# Patient Record
Sex: FEMALE | Race: WHITE | NOT HISPANIC OR LATINO
[De-identification: names, ages, dates, MRNs, and addresses within clinical notes are randomized per-mention and may not be internally consistent; named-entity substitution may affect disease eponyms.]

---

## 2017-10-14 ENCOUNTER — APPOINTMENT (OUTPATIENT)
Dept: INTERNAL MEDICINE | Facility: HOME HEALTH | Age: 82
End: 2017-10-14

## 2017-12-20 ENCOUNTER — APPOINTMENT (OUTPATIENT)
Dept: INTERNAL MEDICINE | Facility: HOME HEALTH | Age: 82
End: 2017-12-20
Payer: MEDICARE

## 2017-12-20 VITALS
OXYGEN SATURATION: 98 % | TEMPERATURE: 97.8 F | DIASTOLIC BLOOD PRESSURE: 70 MMHG | SYSTOLIC BLOOD PRESSURE: 106 MMHG | RESPIRATION RATE: 13 BRPM | HEART RATE: 77 BPM

## 2017-12-20 DIAGNOSIS — Z00.00 ENCOUNTER FOR GENERAL ADULT MEDICAL EXAMINATION W/OUT ABNORMAL FINDINGS: ICD-10-CM

## 2017-12-20 DIAGNOSIS — Z78.9 OTHER SPECIFIED HEALTH STATUS: ICD-10-CM

## 2017-12-20 PROCEDURE — 99345 HOME/RES VST NEW HIGH MDM 75: CPT

## 2017-12-20 RX ORDER — NYSTATIN AND TRIAMCINOLONE ACETONIDE 100000; 1 [USP'U]/G; MG/G
100000-0.1 OINTMENT TOPICAL
Qty: 60 | Refills: 0 | Status: DISCONTINUED | COMMUNITY
Start: 2017-05-03

## 2017-12-20 RX ORDER — BUPROPION HYDROCHLORIDE 150 MG/1
150 TABLET, EXTENDED RELEASE ORAL
Qty: 30 | Refills: 0 | Status: DISCONTINUED | COMMUNITY
Start: 2017-12-18 | End: 2017-12-20

## 2017-12-20 RX ORDER — LEVOMEFOLATE CALCIUM 15 MG
15 TABLET ORAL
Qty: 30 | Refills: 0 | Status: DISCONTINUED | COMMUNITY
Start: 2017-08-15

## 2017-12-20 RX ORDER — SIMVASTATIN 40 MG/1
40 TABLET, FILM COATED ORAL
Qty: 30 | Refills: 0 | Status: DISCONTINUED | COMMUNITY
Start: 2017-02-23

## 2017-12-29 LAB
25(OH)D3 SERPL-MCNC: 24.6 NG/ML
ALBUMIN SERPL ELPH-MCNC: 3.7 G/DL
ALP BLD-CCNC: 84 U/L
ALT SERPL-CCNC: 21 U/L
ANION GAP SERPL CALC-SCNC: 16 MMOL/L
AST SERPL-CCNC: 18 U/L
BASOPHILS # BLD AUTO: 0.05 K/UL
BASOPHILS NFR BLD AUTO: 0.5 %
BILIRUB SERPL-MCNC: <0.2 MG/DL
BUN SERPL-MCNC: 24 MG/DL
CALCIUM SERPL-MCNC: 9.8 MG/DL
CHLORIDE SERPL-SCNC: 102 MMOL/L
CHOLEST SERPL-MCNC: 135 MG/DL
CHOLEST/HDLC SERPL: 3.2 RATIO
CO2 SERPL-SCNC: 22 MMOL/L
CREAT SERPL-MCNC: 0.72 MG/DL
EOSINOPHIL # BLD AUTO: 0.32 K/UL
EOSINOPHIL NFR BLD AUTO: 2.9 %
GLUCOSE SERPL-MCNC: 113 MG/DL
HBA1C MFR BLD HPLC: 6 %
HCT VFR BLD CALC: 41.1 %
HDLC SERPL-MCNC: 42 MG/DL
HGB BLD-MCNC: 12.8 G/DL
IMM GRANULOCYTES NFR BLD AUTO: 0.4 %
LDLC SERPL CALC-MCNC: 70 MG/DL
LYMPHOCYTES # BLD AUTO: 3.04 K/UL
LYMPHOCYTES NFR BLD AUTO: 27.8 %
MAN DIFF?: NORMAL
MCHC RBC-ENTMCNC: 26 PG
MCHC RBC-ENTMCNC: 31.1 GM/DL
MCV RBC AUTO: 83.4 FL
MONOCYTES # BLD AUTO: 0.91 K/UL
MONOCYTES NFR BLD AUTO: 8.3 %
NEUTROPHILS # BLD AUTO: 6.57 K/UL
NEUTROPHILS NFR BLD AUTO: 60.1 %
PLATELET # BLD AUTO: 314 K/UL
POTASSIUM SERPL-SCNC: 5.4 MMOL/L
PROT SERPL-MCNC: 6.9 G/DL
RBC # BLD: 4.93 M/UL
RBC # FLD: 15.1 %
SODIUM SERPL-SCNC: 140 MMOL/L
TRIGL SERPL-MCNC: 114 MG/DL
TSH SERPL-ACNC: 1.61 UIU/ML
VIT B12 SERPL-MCNC: 390 PG/ML
WBC # FLD AUTO: 10.93 K/UL

## 2018-01-08 ENCOUNTER — APPOINTMENT (OUTPATIENT)
Dept: INTERNAL MEDICINE | Facility: HOME HEALTH | Age: 83
End: 2018-01-08
Payer: MEDICARE

## 2018-01-08 VITALS
DIASTOLIC BLOOD PRESSURE: 60 MMHG | TEMPERATURE: 97.2 F | OXYGEN SATURATION: 99 % | SYSTOLIC BLOOD PRESSURE: 110 MMHG | RESPIRATION RATE: 12 BRPM | HEART RATE: 80 BPM

## 2018-01-08 PROCEDURE — 99350 HOME/RES VST EST HIGH MDM 60: CPT

## 2018-01-08 RX ORDER — CARBIDOPA AND LEVODOPA 25; 250 MG/1; MG/1
25-250 TABLET ORAL
Qty: 45 | Refills: 8 | Status: ACTIVE | COMMUNITY
Start: 2017-08-15

## 2018-05-10 ENCOUNTER — APPOINTMENT (OUTPATIENT)
Dept: INTERNAL MEDICINE | Facility: HOME HEALTH | Age: 83
End: 2018-05-10
Payer: MEDICARE

## 2018-05-10 VITALS
RESPIRATION RATE: 13 BRPM | TEMPERATURE: 98.2 F | OXYGEN SATURATION: 97 % | SYSTOLIC BLOOD PRESSURE: 128 MMHG | HEART RATE: 70 BPM | DIASTOLIC BLOOD PRESSURE: 70 MMHG

## 2018-05-10 PROCEDURE — 99350 HOME/RES VST EST HIGH MDM 60: CPT

## 2018-07-27 PROBLEM — Z78.9 ALCOHOL USE: Status: ACTIVE | Noted: 2017-12-20

## 2018-08-10 ENCOUNTER — APPOINTMENT (OUTPATIENT)
Dept: INTERNAL MEDICINE | Facility: HOME HEALTH | Age: 83
End: 2018-08-10
Payer: MEDICARE

## 2018-08-10 VITALS
TEMPERATURE: 97.4 F | HEART RATE: 83 BPM | SYSTOLIC BLOOD PRESSURE: 112 MMHG | OXYGEN SATURATION: 96 % | RESPIRATION RATE: 13 BRPM | DIASTOLIC BLOOD PRESSURE: 62 MMHG

## 2018-08-10 DIAGNOSIS — B35.4 TINEA CORPORIS: ICD-10-CM

## 2018-08-10 PROCEDURE — 99350 HOME/RES VST EST HIGH MDM 60: CPT

## 2018-08-10 RX ORDER — ACYCLOVIR 50 MG/G
5 CREAM TOPICAL
Qty: 1 | Refills: 0 | Status: DISCONTINUED | COMMUNITY
Start: 2018-08-10 | End: 2018-08-10

## 2018-12-20 ENCOUNTER — APPOINTMENT (OUTPATIENT)
Dept: INTERNAL MEDICINE | Facility: HOME HEALTH | Age: 83
End: 2018-12-20
Payer: MEDICARE

## 2018-12-20 VITALS
OXYGEN SATURATION: 96 % | HEART RATE: 85 BPM | DIASTOLIC BLOOD PRESSURE: 60 MMHG | TEMPERATURE: 97.5 F | RESPIRATION RATE: 14 BRPM | SYSTOLIC BLOOD PRESSURE: 108 MMHG

## 2018-12-20 PROCEDURE — 99349 HOME/RES VST EST MOD MDM 40: CPT

## 2018-12-26 LAB
25(OH)D3 SERPL-MCNC: 26.2 NG/ML
ALBUMIN SERPL ELPH-MCNC: 3.6 G/DL
ALP BLD-CCNC: 74 U/L
ALT SERPL-CCNC: 13 U/L
ANION GAP SERPL CALC-SCNC: 12 MMOL/L
AST SERPL-CCNC: 10 U/L
BASOPHILS # BLD AUTO: 0.03 K/UL
BASOPHILS NFR BLD AUTO: 0.3 %
BILIRUB SERPL-MCNC: 0.2 MG/DL
BUN SERPL-MCNC: 30 MG/DL
CALCIUM SERPL-MCNC: 8.9 MG/DL
CHLORIDE SERPL-SCNC: 103 MMOL/L
CHOLEST SERPL-MCNC: 137 MG/DL
CHOLEST/HDLC SERPL: 3.8 RATIO
CO2 SERPL-SCNC: 22 MMOL/L
CREAT SERPL-MCNC: 0.72 MG/DL
EOSINOPHIL # BLD AUTO: 0.39 K/UL
EOSINOPHIL NFR BLD AUTO: 3.9 %
FOLATE SERPL-MCNC: >20 NG/ML
HBA1C MFR BLD HPLC: 6.2 %
HCT VFR BLD CALC: 37.5 %
HDLC SERPL-MCNC: 36 MG/DL
HGB BLD-MCNC: 11.5 G/DL
IMM GRANULOCYTES NFR BLD AUTO: 0.3 %
LDLC SERPL CALC-MCNC: 74 MG/DL
LYMPHOCYTES # BLD AUTO: 2.44 K/UL
LYMPHOCYTES NFR BLD AUTO: 24.6 %
MAN DIFF?: NORMAL
MCHC RBC-ENTMCNC: 24.8 PG
MCHC RBC-ENTMCNC: 30.7 GM/DL
MCV RBC AUTO: 81 FL
MONOCYTES # BLD AUTO: 0.77 K/UL
MONOCYTES NFR BLD AUTO: 7.8 %
NEUTROPHILS # BLD AUTO: 6.26 K/UL
NEUTROPHILS NFR BLD AUTO: 63.1 %
PLATELET # BLD AUTO: 292 K/UL
POTASSIUM SERPL-SCNC: 4.7 MMOL/L
PROT SERPL-MCNC: 6.1 G/DL
RBC # BLD: 4.63 M/UL
RBC # FLD: 15.5 %
SODIUM SERPL-SCNC: 137 MMOL/L
TRIGL SERPL-MCNC: 136 MG/DL
TSH SERPL-ACNC: 1.42 UIU/ML
VIT B12 SERPL-MCNC: 294 PG/ML
WBC # FLD AUTO: 9.92 K/UL

## 2018-12-27 ENCOUNTER — LABORATORY RESULT (OUTPATIENT)
Age: 83
End: 2018-12-27

## 2018-12-27 DIAGNOSIS — R41.0 DISORIENTATION, UNSPECIFIED: ICD-10-CM

## 2019-02-21 ENCOUNTER — APPOINTMENT (OUTPATIENT)
Dept: INTERNAL MEDICINE | Facility: HOME HEALTH | Age: 84
End: 2019-02-21
Payer: MEDICARE

## 2019-02-21 VITALS
SYSTOLIC BLOOD PRESSURE: 110 MMHG | RESPIRATION RATE: 16 BRPM | HEART RATE: 82 BPM | OXYGEN SATURATION: 98 % | DIASTOLIC BLOOD PRESSURE: 62 MMHG | TEMPERATURE: 97.5 F

## 2019-02-21 PROCEDURE — 99350 HOME/RES VST EST HIGH MDM 60: CPT

## 2019-02-21 NOTE — PHYSICAL EXAM
[Normal Sclera/Conjunctiva] : normal sclera/conjunctiva [Normal Outer Ear/Nose] : the ears and nose were normal in appearance [No JVD] : no jugular venous distention [Clear to Auscultation] : lungs were clear to auscultation bilaterally [Regular Rhythm] : with a regular rhythm [Normal Bowel Sounds] : normal bowel sounds [Non Tender] : non-tender [Normal Anterior Cervical Nodes] : no anterior cervical lymphadenopathy [de-identified] : chronically ill [de-identified] : soft lipoma on anterior chest wall [de-identified] : soft [de-identified] : stooped gait [de-identified] : erythema under breasts b/l [de-identified] : weak LE [de-identified] : sleepy today

## 2019-02-21 NOTE — CURRENT MEDS
[Medication and Allergies Reconciled] : medication and allergies reconciled [Compliant with medications] : Patient is compliant with medications [de-identified] : prepared and administered by others

## 2019-02-21 NOTE — HISTORY OF PRESENT ILLNESS
[Patient] : patient [Formal Caregiver] : formal caregiver [FreeTextEntry1] : Parkinson's Disease [FreeTextEntry2] : 88 year old female with Parkinsons Disease, DM, Hyperlipidemia,CAD, depression/anxiety, hypertension, memory loss seen for  follow up of chronic medical conditions.\par \par HHA 9am-9pm and then she has cluster care w/2 hr checks. \par \par In the interim patient was seen by her dentist who recommend 4 upper teeth extractions. Area is very tender to touch. HCP will decide on possible intervention.\par \par No falls since last visit.  Appetite is good- skips breakfast because she normally awakens at noon.  \par +hospital bed, rollator and w/c.  One person assist with standing and walking.\par \par \par \par

## 2019-02-21 NOTE — REVIEW OF SYSTEMS
[Vision Problems] : vision problems [Constipation] : constipation [Incontinence] : incontinence [Memory Loss] : memory loss [Unsteady Walking] : ataxia [Anxiety] : anxiety [Negative] : Heme/Lymph [Suicidal] : not suicidal [FreeTextEntry2] : +fatigue [FreeTextEntry3] : +dry eyes [FreeTextEntry4] : +halitosis, drooling [FreeTextEntry7] : constipation, fecal incontinence [FreeTextEntry9] : stiff LE [de-identified] : +depression and anxiety

## 2019-02-21 NOTE — COUNSELING
[TLC diet recommended] : TLC diet recommended [Non - Smoker] : non-smoker [Minimize unnecessary interventions] : minimize unnecessary interventions [Discussed disease trajectory with patient/caregiver] : discussed disease trajectory with patient/caregiver [FreeTextEntry2] : Call [phone] for all medical concerns

## 2019-02-21 NOTE — REASON FOR VISIT
[Follow-Up] : a follow-up visit [Pre-Visit Preparation] : pre-visit preparation was done [Intercurrent Specialty/Sub-specialty Visits] : the patient has intercurrent specialty/sub-specialty visits [FreeTextEntry1] : Parkinsons Disease, Parkinsons Dementia, DM, HPL/CAD, depression/anxiety, hypertension [FreeTextEntry2] : reviewed meds, notes, labs [FreeTextEntry3] : psychiatry

## 2019-02-22 ENCOUNTER — APPOINTMENT (OUTPATIENT)
Dept: INTERNAL MEDICINE | Facility: HOME HEALTH | Age: 84
End: 2019-02-22

## 2019-02-25 LAB
25(OH)D3 SERPL-MCNC: 24.9 NG/ML
ALBUMIN SERPL ELPH-MCNC: 3.8 G/DL
ALP BLD-CCNC: 80 U/L
ALT SERPL-CCNC: 16 U/L
ANION GAP SERPL CALC-SCNC: 16 MMOL/L
AST SERPL-CCNC: 11 U/L
BASOPHILS # BLD AUTO: 0.06 K/UL
BASOPHILS NFR BLD AUTO: 0.6 %
BILIRUB SERPL-MCNC: 0.2 MG/DL
BUN SERPL-MCNC: 25 MG/DL
CALCIUM SERPL-MCNC: 9.4 MG/DL
CHLORIDE SERPL-SCNC: 104 MMOL/L
CHOLEST SERPL-MCNC: 134 MG/DL
CHOLEST/HDLC SERPL: 3.3 RATIO
CO2 SERPL-SCNC: 24 MMOL/L
CREAT SERPL-MCNC: 0.71 MG/DL
EOSINOPHIL # BLD AUTO: 0.44 K/UL
EOSINOPHIL NFR BLD AUTO: 4.1 %
FOLATE SERPL-MCNC: >20 NG/ML
HBA1C MFR BLD HPLC: 6 %
HCT VFR BLD CALC: 39.5 %
HDLC SERPL-MCNC: 41 MG/DL
HGB BLD-MCNC: 11.9 G/DL
IMM GRANULOCYTES NFR BLD AUTO: 0.4 %
LDLC SERPL CALC-MCNC: 74 MG/DL
LYMPHOCYTES # BLD AUTO: 3.13 K/UL
LYMPHOCYTES NFR BLD AUTO: 29 %
MAN DIFF?: NORMAL
MCHC RBC-ENTMCNC: 25.3 PG
MCHC RBC-ENTMCNC: 30.1 GM/DL
MCV RBC AUTO: 83.9 FL
MONOCYTES # BLD AUTO: 0.88 K/UL
MONOCYTES NFR BLD AUTO: 8.1 %
NEUTROPHILS # BLD AUTO: 6.26 K/UL
NEUTROPHILS NFR BLD AUTO: 57.8 %
PLATELET # BLD AUTO: 323 K/UL
POTASSIUM SERPL-SCNC: 4.8 MMOL/L
PROT SERPL-MCNC: 6.4 G/DL
RBC # BLD: 4.71 M/UL
RBC # FLD: 14.6 %
SODIUM SERPL-SCNC: 144 MMOL/L
TRIGL SERPL-MCNC: 97 MG/DL
TSH SERPL-ACNC: 1.8 UIU/ML
VIT B12 SERPL-MCNC: 255 PG/ML
WBC # FLD AUTO: 10.81 K/UL

## 2019-04-12 ENCOUNTER — APPOINTMENT (OUTPATIENT)
Dept: INTERNAL MEDICINE | Facility: HOME HEALTH | Age: 84
End: 2019-04-12
Payer: MEDICARE

## 2019-04-12 VITALS
HEART RATE: 85 BPM | SYSTOLIC BLOOD PRESSURE: 100 MMHG | OXYGEN SATURATION: 95 % | TEMPERATURE: 97.1 F | RESPIRATION RATE: 18 BRPM | DIASTOLIC BLOOD PRESSURE: 70 MMHG

## 2019-04-12 DIAGNOSIS — E11.9 TYPE 2 DIABETES MELLITUS W/OUT COMPLICATIONS: ICD-10-CM

## 2019-04-12 PROCEDURE — 99349 HOME/RES VST EST MOD MDM 40: CPT

## 2019-06-11 ENCOUNTER — APPOINTMENT (OUTPATIENT)
Dept: INTERNAL MEDICINE | Facility: HOME HEALTH | Age: 84
End: 2019-06-11
Payer: MEDICARE

## 2019-06-11 VITALS
TEMPERATURE: 97 F | SYSTOLIC BLOOD PRESSURE: 100 MMHG | HEART RATE: 85 BPM | OXYGEN SATURATION: 96 % | RESPIRATION RATE: 18 BRPM | DIASTOLIC BLOOD PRESSURE: 70 MMHG

## 2019-06-11 PROCEDURE — 99349 HOME/RES VST EST MOD MDM 40: CPT

## 2019-06-11 NOTE — COUNSELING
[TLC diet recommended] : TLC diet recommended [Non - Smoker] : non-smoker [Discussed disease trajectory with patient/caregiver] : discussed disease trajectory with patient/caregiver [Minimize unnecessary interventions] : minimize unnecessary interventions [FreeTextEntry2] : Call [phone] for all medical concerns

## 2019-06-11 NOTE — REVIEW OF SYSTEMS
[Vision Problems] : vision problems [Constipation] : constipation [Memory Loss] : memory loss [Incontinence] : incontinence [Unsteady Walking] : ataxia [Anxiety] : anxiety [Negative] : Heme/Lymph [Suicidal] : not suicidal [FreeTextEntry7] : constipation, fecal incontinence [FreeTextEntry3] : +dry eyes [FreeTextEntry4] : +halitosis, drooling [FreeTextEntry2] : +fatigue [de-identified] : +depression and anxiety [FreeTextEntry9] : stiff LE

## 2019-06-11 NOTE — HISTORY OF PRESENT ILLNESS
[Patient] : patient [Formal Caregiver] : formal caregiver [FreeTextEntry1] : Parkinson's Disease [FreeTextEntry2] : 88 year old female with Parkinsons Disease, DM, Hyperlipidemia,CAD, depression/anxiety, hypertension, memory loss seen for  follow up of chronic medical conditions.\par \par HHA 9am-9pm and then she has cluster care w/2 hr checks. \par \par In the interim patient was seen by her dentist who recommend 4 upper teeth extractions with sedation as patient was combative during exam- family deferred. Since dental visit upper teeth has fallen off - patient has less discomfort as per HHA. HCP will decide on possible intervention.\par \par No falls since last visit.  Appetite is good- skips breakfast because she normally awakens at noon.  \par +hospital bed, rollator and w/c.  One person assist with standing and walking.\par \par \par \par

## 2019-06-11 NOTE — CURRENT MEDS
[Compliant with medications] : Patient is compliant with medications [Medication and Allergies Reconciled] : medication and allergies reconciled [de-identified] : prepared and administered by others

## 2019-06-11 NOTE — CHRONIC CARE ASSESSMENT
[FAST Score: ____] : Functional Assessment Scale (FAST) Score: [unfilled] [PPS Score: ____] : Palliative Performance Scale (PPS) Score: [unfilled]

## 2019-06-11 NOTE — PHYSICAL EXAM
[Normal Sclera/Conjunctiva] : normal sclera/conjunctiva [Normal Outer Ear/Nose] : the ears and nose were normal in appearance [No JVD] : no jugular venous distention [Clear to Auscultation] : lungs were clear to auscultation bilaterally [Regular Rhythm] : with a regular rhythm [Non Tender] : non-tender [Normal Bowel Sounds] : normal bowel sounds [Normal Anterior Cervical Nodes] : no anterior cervical lymphadenopathy [No Rash] : no rash [de-identified] : chronically ill [de-identified] : soft [de-identified] : soft lipoma on anterior chest wall [de-identified] : no signs of infection in oral cavity, dental  implants exposed, gums pink [de-identified] : sleepy today [de-identified] : stooped gait [de-identified] : weak LE

## 2019-07-11 NOTE — REASON FOR VISIT
[Follow-Up] : a follow-up visit [Intercurrent Specialty/Sub-specialty Visits] : the patient has intercurrent specialty/sub-specialty visits [Pre-Visit Preparation] : pre-visit preparation was done [FreeTextEntry1] : Parkinsons Disease, Parkinsons Dementia, DM, HPL/CAD, depression/anxiety, hypertension [FreeTextEntry2] : reviewed meds, notes, labs [FreeTextEntry3] : psychiatry

## 2019-07-11 NOTE — PHYSICAL EXAM
[Normal Sclera/Conjunctiva] : normal sclera/conjunctiva [Normal Outer Ear/Nose] : the ears and nose were normal in appearance [No JVD] : no jugular venous distention [Clear to Auscultation] : lungs were clear to auscultation bilaterally [Regular Rhythm] : with a regular rhythm [Normal Bowel Sounds] : normal bowel sounds [Normal Anterior Cervical Nodes] : no anterior cervical lymphadenopathy [Non Tender] : non-tender [No Rash] : no rash [de-identified] : chronically ill [de-identified] : no signs of infection in oral cavity, dental  implants exposed, gums pink [de-identified] : soft lipoma on anterior chest wall [de-identified] : soft [de-identified] : stooped gait [de-identified] : weak LE [de-identified] : sleepy today

## 2019-07-11 NOTE — REVIEW OF SYSTEMS
[Vision Problems] : vision problems [Incontinence] : incontinence [Constipation] : constipation [Memory Loss] : memory loss [Unsteady Walking] : ataxia [Anxiety] : anxiety [Negative] : Heme/Lymph [FreeTextEntry2] : +fatigue [Suicidal] : not suicidal [FreeTextEntry4] : +halitosis, drooling [FreeTextEntry3] : +dry eyes [FreeTextEntry7] : constipation, fecal incontinence [FreeTextEntry9] : stiff LE [de-identified] : +depression and anxiety

## 2019-07-11 NOTE — CURRENT MEDS
[Medication and Allergies Reconciled] : medication and allergies reconciled [Compliant with medications] : Patient is compliant with medications [de-identified] : prepared and administered by others

## 2019-07-11 NOTE — COUNSELING
[TLC diet recommended] : TLC diet recommended [Non - Smoker] : non-smoker [Minimize unnecessary interventions] : minimize unnecessary interventions [Discussed disease trajectory with patient/caregiver] : discussed disease trajectory with patient/caregiver [FreeTextEntry2] : Call [phone] for all medical concerns

## 2019-07-26 ENCOUNTER — APPOINTMENT (OUTPATIENT)
Dept: INTERNAL MEDICINE | Facility: HOME HEALTH | Age: 84
End: 2019-07-26
Payer: MEDICARE

## 2019-07-26 VITALS
HEART RATE: 86 BPM | SYSTOLIC BLOOD PRESSURE: 100 MMHG | RESPIRATION RATE: 18 BRPM | OXYGEN SATURATION: 97 % | TEMPERATURE: 98 F | DIASTOLIC BLOOD PRESSURE: 80 MMHG

## 2019-07-26 DIAGNOSIS — R19.6 HALITOSIS: ICD-10-CM

## 2019-07-26 PROCEDURE — 99349 HOME/RES VST EST MOD MDM 40: CPT

## 2019-07-26 RX ORDER — NYSTATIN 100000 [USP'U]/G
100000 CREAM TOPICAL TWICE DAILY
Qty: 1 | Refills: 8 | Status: DISCONTINUED | COMMUNITY
Start: 2017-12-20 | End: 2019-07-26

## 2019-07-26 RX ORDER — AMOXICILLIN AND CLAVULANATE POTASSIUM 500; 125 MG/1; MG/1
500-125 TABLET, FILM COATED ORAL
Qty: 20 | Refills: 0 | Status: DISCONTINUED | COMMUNITY
Start: 2019-02-21 | End: 2019-07-26

## 2019-07-26 NOTE — REVIEW OF SYSTEMS
[Suicidal] : not suicidal [FreeTextEntry2] : +fatigue [FreeTextEntry3] : +dry eyes [FreeTextEntry4] : +halitosis, drooling [FreeTextEntry7] : constipation, fecal incontinence [de-identified] : +depression and anxiety [FreeTextEntry9] : stiff LE

## 2019-07-26 NOTE — COUNSELING
[FreeTextEntry4] : Reduced hospitalizations\par Keep patient comfortable.  [FreeTextEntry2] : Call [phone] for all medical concerns

## 2019-07-26 NOTE — PHYSICAL EXAM
[de-identified] : soft lipoma on anterior chest wall [de-identified] : no signs of infection in oral cavity, dental  implants exposed, gums pink [de-identified] : chronically ill [de-identified] : soft [de-identified] : stooped gait [de-identified] : sleepy today [de-identified] : weak LE

## 2019-07-26 NOTE — HISTORY OF PRESENT ILLNESS
[FreeTextEntry1] : Parkinson's Disease [FreeTextEntry2] : 88 year old female with Parkinsons Disease, DM, Hyperlipidemia,CAD, depression/anxiety, hypertension, memory loss seen for  follow up of chronic medical conditions.\par \par HHA 9am-9pm and then she has [facility] care w/2 hr checks. \par \par On last visit it was noted that the patient was seen by the dentist who recommend 4 upper teeth extractions with sedation as patient was combative during exam- family deferred. Since dental visit upper teeth has fallen off - patient has less discomfort as per HHA. HCP will decide on possible intervention.\par \par Currently no complaints or concerns.  Patient has been eating well.  No issues to report.\par \par Patient/ patient's caregiver reports no weight loss >10lbs in the past 6 months. No changes in dentition or swallowing reported, No changes in hearing or vision reported. No changes in Cognition reported. Patient denies any symptoms of depression or anxiety. Patient is ADL iand IADL dependent. No changes in gait or falls reported. \par Patient's home environment is safe.\par \par \par \par \par \par

## 2019-07-26 NOTE — REASON FOR VISIT
[FreeTextEntry1] : Parkinsons Disease, Parkinsons Dementia, DM, HPL/CAD, depression/anxiety, hypertension [FreeTextEntry2] : reviewed meds, notes, labs [FreeTextEntry3] : psychiatry

## 2019-10-07 ENCOUNTER — APPOINTMENT (OUTPATIENT)
Dept: INTERNAL MEDICINE | Facility: HOME HEALTH | Age: 84
End: 2019-10-07
Payer: MEDICARE

## 2019-10-07 VITALS
SYSTOLIC BLOOD PRESSURE: 100 MMHG | DIASTOLIC BLOOD PRESSURE: 80 MMHG | RESPIRATION RATE: 18 BRPM | HEART RATE: 85 BPM | OXYGEN SATURATION: 96 % | TEMPERATURE: 98 F

## 2019-10-07 PROCEDURE — 99348 HOME/RES VST EST LOW MDM 30: CPT

## 2019-10-07 NOTE — REVIEW OF SYSTEMS
[Suicidal] : not suicidal [FreeTextEntry2] : +fatigue [FreeTextEntry3] : +dry eyes [FreeTextEntry4] : +halitosis, drooling [FreeTextEntry7] : constipation, fecal incontinence [FreeTextEntry9] : stiff LE [de-identified] : +depression and anxiety

## 2019-10-07 NOTE — HISTORY OF PRESENT ILLNESS
[FreeTextEntry1] : Parkinson's Disease [FreeTextEntry2] : 88 year old female with Parkinsons Disease, DM, Hyperlipidemia,CAD, depression/anxiety, hypertension, memory loss seen for  follow up of chronic medical conditions.\par \par HHA 9am-9pm and then she has cluster care w/2 hr checks. \par \par On last visit there were no complaints or concerns.  Patient has been eating well.  No issues to report.\par \par Currently RN reported that the patient had trouble swallowing pill.  HHA present denied dysphagia.  No fever or chills. \par \par Patient/ patient's caregiver reports no weight loss >10lbs in the past 6 months. No changes in dentition or swallowing reported, No changes in hearing or vision reported. No changes in Cognition reported. Patient denies any symptoms of depression or anxiety. Patient is ADL iand IADL dependent. No changes in gait or falls reported. \par Patient's home environment is safe.\par \par \par \par \par \par

## 2019-10-07 NOTE — PHYSICAL EXAM
[de-identified] : chronically ill [de-identified] : soft lipoma on anterior chest wall [de-identified] : no signs of infection in oral cavity, dental  implants exposed, gums pink [de-identified] : soft [de-identified] : stooped gait [de-identified] : sleepy today [de-identified] : weak LE

## 2019-10-07 NOTE — COUNSELING
[FreeTextEntry4] : Reduced hospitalizations\par Keep patient comfortable.  [FreeTextEntry2] : Call [phone] for all medical concerns

## 2019-11-07 ENCOUNTER — APPOINTMENT (OUTPATIENT)
Dept: INTERNAL MEDICINE | Facility: HOME HEALTH | Age: 84
End: 2019-11-07
Payer: MEDICARE

## 2019-11-07 VITALS
HEART RATE: 80 BPM | DIASTOLIC BLOOD PRESSURE: 80 MMHG | TEMPERATURE: 98 F | SYSTOLIC BLOOD PRESSURE: 110 MMHG | OXYGEN SATURATION: 97 % | RESPIRATION RATE: 18 BRPM

## 2019-11-07 PROCEDURE — 99349 HOME/RES VST EST MOD MDM 40: CPT

## 2019-11-07 NOTE — HISTORY OF PRESENT ILLNESS
[Patient] : patient [Formal Caregiver] : formal caregiver [FreeTextEntry2] : 88 year old female with Parkinsons Disease, DM, Hyperlipidemia,CAD, depression/anxiety, hypertension, memory loss seen for  follow up of chronic medical conditions.\par \par HHA 9am-9pm and then she has cluster care w/2 hr checks. \par \par On last visit  RN reported that the patient had trouble swallowing pill.  HHA present denied dysphagia.  No fever or chills. \par \par Patient/ patient's caregiver reports no weight loss >10lbs in the past 6 months. No changes in dentition or swallowing reported, No changes in hearing or vision reported. No changes in Cognition reported. Patient denies any symptoms of depression or anxiety. Patient is ADL iand IADL dependent. No changes in gait or falls reported. \par Patient's home environment is safe.\par \par \par \par \par \par  [FreeTextEntry1] : Parkinson's Disease

## 2019-11-07 NOTE — REVIEW OF SYSTEMS
[Vision Problems] : vision problems [Constipation] : constipation [Incontinence] : incontinence [Memory Loss] : memory loss [Unsteady Walking] : ataxia [Anxiety] : anxiety [Negative] : Heme/Lymph [Suicidal] : not suicidal [FreeTextEntry2] : +fatigue [FreeTextEntry3] : +dry eyes [FreeTextEntry4] : +halitosis, drooling [FreeTextEntry9] : stiff LE [FreeTextEntry7] : constipation, fecal incontinence [de-identified] : +depression and anxiety

## 2019-11-07 NOTE — PHYSICAL EXAM
[Normal Sclera/Conjunctiva] : normal sclera/conjunctiva [Normal Outer Ear/Nose] : the ears and nose were normal in appearance [No JVD] : no jugular venous distention [Clear to Auscultation] : lungs were clear to auscultation bilaterally [Regular Rhythm] : with a regular rhythm [Normal Bowel Sounds] : normal bowel sounds [Non Tender] : non-tender [Normal Anterior Cervical Nodes] : no anterior cervical lymphadenopathy [No Rash] : no rash [de-identified] : chronically ill [de-identified] : soft lipoma on anterior chest wall [de-identified] : no signs of infection in oral cavity, dental  implants exposed, gums pink [de-identified] : stooped gait [de-identified] : soft [de-identified] : weak LE [de-identified] : sleepy today

## 2019-11-07 NOTE — CURRENT MEDS
[High Risk Medications Reviewed and Reconciled (Beers Criteria)] : high risk medications reviewed and reconciled [Medication and Allergies Reconciled] : medication and allergies reconciled [Compliant with medications] : Patient is compliant with medications [de-identified] : prepared and administered by others

## 2019-11-07 NOTE — COUNSELING
[High cholesterol self management education material provided] : high cholesterol self management education material provided [Hypertension self management education material provided] : hypertension self management education material provided [Use assistive device to avoid falls] : use assistive device to avoid falls [___] : [unfilled] [] : foot exam [Not Recommended] : Aspirin use not recommended due to overall prognosis [Minimize unnecessary interventions] : minimize unnecessary interventions [Discussed disease trajectory with patient/caregiver] : discussed disease trajectory with patient/caregiver [Full Code] : Code Status: Full Code [No Limitations] : Treatment Guidelines: No limitations [Trial of Intubation] : Intubation: Trial of Intubation [[facility]: _____] [facility]: [unfilled] [TLC diet recommended] : TLC diet recommended [Non - Smoker] : non-smoker [FreeTextEntry4] : Reduced hospitalizations\par Keep patient comfortable.  [FreeTextEntry2] : Call [phone] for all medical concerns

## 2019-12-04 ENCOUNTER — APPOINTMENT (OUTPATIENT)
Dept: INTERNAL MEDICINE | Facility: HOME HEALTH | Age: 84
End: 2019-12-04
Payer: MEDICARE

## 2019-12-04 VITALS
SYSTOLIC BLOOD PRESSURE: 122 MMHG | TEMPERATURE: 98 F | DIASTOLIC BLOOD PRESSURE: 80 MMHG | HEART RATE: 80 BPM | RESPIRATION RATE: 18 BRPM | OXYGEN SATURATION: 97 %

## 2019-12-04 PROCEDURE — 99349 HOME/RES VST EST MOD MDM 40: CPT

## 2019-12-04 RX ORDER — ALPRAZOLAM 0.5 MG/1
0.5 TABLET ORAL
Qty: 120 | Refills: 0 | Status: DISCONTINUED | COMMUNITY
Start: 2017-08-28 | End: 2019-12-04

## 2019-12-04 RX ORDER — CHLORHEXIDINE GLUCONATE, 0.12% ORAL RINSE 1.2 MG/ML
0.12 SOLUTION DENTAL
Qty: 1 | Refills: 1 | Status: DISCONTINUED | COMMUNITY
Start: 2018-05-10 | End: 2019-12-04

## 2019-12-04 NOTE — HISTORY OF PRESENT ILLNESS
[Formal Caregiver] : formal caregiver [Patient] : patient [FreeTextEntry1] : Parkinson's Disease [FreeTextEntry2] : 88 year old female with Parkinsons Disease, DM, Hyperlipidemia,CAD, depression/anxiety, hypertension, memory loss seen for  follow up of chronic medical conditions.\par \nicho MAGEN 9am-9pm and then she has cluster care w/2 hr checks. \par \par MAGEN Mendoza reports weakness in her legs unable to pivot as well for transfers.  Unable to follow direction with physical therapy\par \par Patient/ patient's caregiver reports no weight loss >10lbs in the past 6 months. No changes in dentition or swallowing reported, No changes in hearing or vision reported. No changes in Cognition reported. Patient denies any symptoms of depression or anxiety. Patient is ADL and IADL dependent. No changes in gait or falls reported. \par Patient's home environment is safe.\par \par

## 2019-12-04 NOTE — PHYSICAL EXAM
[Normal Sclera/Conjunctiva] : normal sclera/conjunctiva [Normal Outer Ear/Nose] : the ears and nose were normal in appearance [No JVD] : no jugular venous distention [Clear to Auscultation] : lungs were clear to auscultation bilaterally [Normal Bowel Sounds] : normal bowel sounds [Regular Rhythm] : with a regular rhythm [Non Tender] : non-tender [Normal Anterior Cervical Nodes] : no anterior cervical lymphadenopathy [No Rash] : no rash [de-identified] : no signs of infection in oral cavity, dental  implants exposed, gums pink [de-identified] : chronically ill [de-identified] : soft lipoma on anterior chest wall [de-identified] : soft [de-identified] : stooped gait [de-identified] : sleepy today [de-identified] : weak LE

## 2019-12-04 NOTE — REASON FOR VISIT
[Follow-Up] : a follow-up visit [Pre-Visit Preparation] : pre-visit preparation was done [Intercurrent Specialty/Sub-specialty Visits] : the patient has intercurrent specialty/sub-specialty visits [FreeTextEntry2] : reviewed meds, notes, labs [FreeTextEntry1] : Parkinsons Disease, Parkinsons Dementia, DM, HPL/CAD, depression/anxiety, hypertension [FreeTextEntry3] : psychiatry

## 2019-12-04 NOTE — REVIEW OF SYSTEMS
[Vision Problems] : vision problems [Constipation] : constipation [Incontinence] : incontinence [Memory Loss] : memory loss [Unsteady Walking] : ataxia [Anxiety] : anxiety [Negative] : Heme/Lymph [Suicidal] : not suicidal [FreeTextEntry2] : +fatigue [FreeTextEntry3] : +dry eyes [FreeTextEntry4] : +halitosis, drooling [FreeTextEntry7] : constipation, fecal incontinence [FreeTextEntry9] : stiff LE [de-identified] : +depression and anxiety

## 2019-12-04 NOTE — CURRENT MEDS
[Medication and Allergies Reconciled] : medication and allergies reconciled [Compliant with medications] : Patient is compliant with medications [de-identified] : prepared and administered by others

## 2019-12-04 NOTE — COUNSELING
[Normal Weight - ( BMI  <25 )] : normal weight - ( BMI  <25 ) [Mediterranean diet recommended] : Mediterranean diet recommended [Minimize unnecessary interventions] : minimize unnecessary interventions [Discussed disease trajectory with patient/caregiver] : discussed disease trajectory with patient/caregiver [TLC diet recommended] : TLC diet recommended [Non - Smoker] : non-smoker [FreeTextEntry2] : Call [phone] for all medical concerns

## 2019-12-06 ENCOUNTER — RESULT CHARGE (OUTPATIENT)
Age: 84
End: 2019-12-06

## 2019-12-06 LAB
25(OH)D3 SERPL-MCNC: 34 NG/ML
ALBUMIN SERPL ELPH-MCNC: 3.7 G/DL
ALP BLD-CCNC: 60 U/L
ALT SERPL-CCNC: 15 U/L
ANION GAP SERPL CALC-SCNC: 12 MMOL/L
AST SERPL-CCNC: 16 U/L
BASOPHILS # BLD AUTO: 0.06 K/UL
BASOPHILS NFR BLD AUTO: 0.7 %
BILIRUB SERPL-MCNC: 0.2 MG/DL
BUN SERPL-MCNC: 22 MG/DL
CALCIUM SERPL-MCNC: 9.4 MG/DL
CHLORIDE SERPL-SCNC: 106 MMOL/L
CO2 SERPL-SCNC: 23 MMOL/L
CREAT SERPL-MCNC: 0.68 MG/DL
EOSINOPHIL # BLD AUTO: 0.28 K/UL
EOSINOPHIL NFR BLD AUTO: 3.2 %
ESTIMATED AVERAGE GLUCOSE: 120 MG/DL
GLUCOSE SERPL-MCNC: 92 MG/DL
HBA1C MFR BLD HPLC: 5.8 %
HCT VFR BLD CALC: 39.2 %
HGB BLD-MCNC: 12.3 G/DL
IMM GRANULOCYTES NFR BLD AUTO: 0.3 %
LYMPHOCYTES # BLD AUTO: 3 K/UL
LYMPHOCYTES NFR BLD AUTO: 34.1 %
MAN DIFF?: NORMAL
MCHC RBC-ENTMCNC: 26.6 PG
MCHC RBC-ENTMCNC: 31.4 GM/DL
MCV RBC AUTO: 84.7 FL
MONOCYTES # BLD AUTO: 0.75 K/UL
MONOCYTES NFR BLD AUTO: 8.5 %
NEUTROPHILS # BLD AUTO: 4.69 K/UL
NEUTROPHILS NFR BLD AUTO: 53.2 %
PLATELET # BLD AUTO: 283 K/UL
POTASSIUM SERPL-SCNC: 5.6 MMOL/L
PROT SERPL-MCNC: 6.2 G/DL
RBC # BLD: 4.63 M/UL
RBC # FLD: 14.1 %
SODIUM SERPL-SCNC: 141 MMOL/L
TSH SERPL-ACNC: 1.82 UIU/ML
WBC # FLD AUTO: 8.81 K/UL

## 2020-01-01 ENCOUNTER — NON-APPOINTMENT (OUTPATIENT)
Age: 85
End: 2020-01-01

## 2020-01-01 ENCOUNTER — APPOINTMENT (OUTPATIENT)
Dept: HOME HEALTH SERVICES | Facility: HOME HEALTH | Age: 85
End: 2020-01-01

## 2020-01-01 ENCOUNTER — APPOINTMENT (OUTPATIENT)
Dept: HOME HEALTH SERVICES | Facility: HOME HEALTH | Age: 85
End: 2020-01-01
Payer: MEDICARE

## 2020-01-01 VITALS
SYSTOLIC BLOOD PRESSURE: 120 MMHG | OXYGEN SATURATION: 97 % | HEART RATE: 82 BPM | DIASTOLIC BLOOD PRESSURE: 80 MMHG | TEMPERATURE: 97 F | RESPIRATION RATE: 18 BRPM

## 2020-01-01 DIAGNOSIS — B00.9 HERPESVIRAL INFECTION, UNSPECIFIED: ICD-10-CM

## 2020-01-01 DIAGNOSIS — Z23 ENCOUNTER FOR IMMUNIZATION: ICD-10-CM

## 2020-01-01 PROCEDURE — 99349 HOME/RES VST EST MOD MDM 40: CPT

## 2020-01-01 RX ORDER — MULTIVITAMIN,THER AND MINERALS
TABLET ORAL
Qty: 30 | Refills: 8 | Status: DISCONTINUED | COMMUNITY
Start: 2017-08-15 | End: 2020-01-01

## 2020-01-01 RX ORDER — NYSTATIN 100000 1/G
100000 POWDER TOPICAL 3 TIMES DAILY
Qty: 1 | Refills: 8 | Status: DISCONTINUED | COMMUNITY
Start: 2017-12-20 | End: 2020-01-01

## 2020-01-01 RX ORDER — DOCOSANOL 100 MG/G
10 CREAM TOPICAL DAILY
Qty: 1 | Refills: 1 | Status: DISCONTINUED | COMMUNITY
Start: 2018-08-10 | End: 2020-01-01

## 2020-01-01 RX ORDER — TRAZODONE HYDROCHLORIDE 100 MG/1
100 TABLET ORAL
Qty: 90 | Refills: 3 | Status: DISCONTINUED | COMMUNITY
Start: 2017-06-13 | End: 2020-01-01

## 2020-05-22 ENCOUNTER — APPOINTMENT (OUTPATIENT)
Dept: HOME HEALTH SERVICES | Facility: HOME HEALTH | Age: 85
End: 2020-05-22

## 2020-06-12 ENCOUNTER — APPOINTMENT (OUTPATIENT)
Dept: HOME HEALTH SERVICES | Facility: HOME HEALTH | Age: 85
End: 2020-06-12

## 2020-07-02 ENCOUNTER — APPOINTMENT (OUTPATIENT)
Dept: HOME HEALTH SERVICES | Facility: HOME HEALTH | Age: 85
End: 2020-07-02

## 2020-09-21 PROBLEM — B00.9 HERPES SIMPLEX: Status: ACTIVE | Noted: 2018-08-10

## 2020-09-22 NOTE — PHYSICAL EXAM
[Normal Sclera/Conjunctiva] : normal sclera/conjunctiva [Normal Outer Ear/Nose] : the ears and nose were normal in appearance [No JVD] : no jugular venous distention [Clear to Auscultation] : lungs were clear to auscultation bilaterally [Regular Rhythm] : with a regular rhythm [Normal Bowel Sounds] : normal bowel sounds [Non Tender] : non-tender [Normal Anterior Cervical Nodes] : no anterior cervical lymphadenopathy [No Rash] : no rash [de-identified] : chronically ill [de-identified] : no signs of infection in oral cavity, dental  implants exposed, gums pink [de-identified] : soft lipoma on anterior chest wall [de-identified] : soft [de-identified] : stooped gait [de-identified] : weak LE [de-identified] : sleepy today

## 2020-09-22 NOTE — CURRENT MEDS
[Medication and Allergies Reconciled] : medication and allergies reconciled [Compliant with medications] : Patient is compliant with medications [de-identified] : prepared and administered by others

## 2020-09-22 NOTE — REVIEW OF SYSTEMS
[Vision Problems] : vision problems [Constipation] : constipation [Incontinence] : incontinence [Memory Loss] : memory loss [Unsteady Walking] : ataxia [Anxiety] : anxiety [Negative] : Heme/Lymph [Suicidal] : not suicidal [FreeTextEntry2] : +fatigue [FreeTextEntry3] : +dry eyes [FreeTextEntry4] : +halitosis, drooling [FreeTextEntry7] : constipation, fecal incontinence [FreeTextEntry9] : stiff LE [de-identified] : +depression and anxiety

## 2020-09-22 NOTE — COUNSELING
[Normal Weight - ( BMI  <25 )] : normal weight - ( BMI  <25 ) [Mediterranean diet recommended] : Mediterranean diet recommended [Minimize unnecessary interventions] : minimize unnecessary interventions [Discussed disease trajectory with patient/caregiver] : discussed disease trajectory with patient/caregiver [TLC diet recommended] : TLC diet recommended [Non - Smoker] : non-smoker [FreeTextEntry2] : Call [phone] for all medical concerns

## 2020-09-22 NOTE — HISTORY OF PRESENT ILLNESS
[Patient] : patient [Formal Caregiver] : formal caregiver [FreeTextEntry1] : Parkinson's Disease [FreeTextEntry2] : 89 year old female with Parkinsons Disease, DM, Hyperlipidemia,CAD, depression/anxiety, hypertension, memory loss seen for  follow up of chronic medical conditions.\par \par No issues or concerns at this time.  Appetite good- 3 meals a day.  No change in mental status. \par \par Patient/ patient's caregiver reports no weight loss >10lbs in the past 6 months. No changes in dentition or swallowing reported, No changes in hearing or vision reported. No changes in Cognition reported. Patient denies any symptoms of depression or anxiety. Patient is ADL and IADL dependent. No changes in gait or falls reported. \par Patient's home environment is safe.\par \par

## 2020-10-19 PROBLEM — Z23 ENCOUNTER FOR IMMUNIZATION: Status: ACTIVE | Noted: 2020-01-01

## 2021-01-01 ENCOUNTER — APPOINTMENT (OUTPATIENT)
Dept: HOME HEALTH SERVICES | Facility: HOME HEALTH | Age: 86
End: 2021-01-01
Payer: MEDICARE

## 2021-01-01 ENCOUNTER — TRANSCRIPTION ENCOUNTER (OUTPATIENT)
Age: 86
End: 2021-01-01

## 2021-01-01 ENCOUNTER — NON-APPOINTMENT (OUTPATIENT)
Age: 86
End: 2021-01-01

## 2021-01-01 VITALS
DIASTOLIC BLOOD PRESSURE: 70 MMHG | RESPIRATION RATE: 18 BRPM | TEMPERATURE: 97.2 F | OXYGEN SATURATION: 96 % | SYSTOLIC BLOOD PRESSURE: 122 MMHG | HEART RATE: 74 BPM

## 2021-01-01 VITALS
HEART RATE: 82 BPM | DIASTOLIC BLOOD PRESSURE: 80 MMHG | RESPIRATION RATE: 18 BRPM | OXYGEN SATURATION: 96 % | TEMPERATURE: 98 F | SYSTOLIC BLOOD PRESSURE: 120 MMHG

## 2021-01-01 VITALS
SYSTOLIC BLOOD PRESSURE: 122 MMHG | HEART RATE: 78 BPM | DIASTOLIC BLOOD PRESSURE: 80 MMHG | RESPIRATION RATE: 18 BRPM | TEMPERATURE: 97 F | OXYGEN SATURATION: 97 %

## 2021-01-01 VITALS
TEMPERATURE: 97 F | HEART RATE: 76 BPM | DIASTOLIC BLOOD PRESSURE: 80 MMHG | RESPIRATION RATE: 18 BRPM | SYSTOLIC BLOOD PRESSURE: 122 MMHG

## 2021-01-01 DIAGNOSIS — Z23 ENCOUNTER FOR IMMUNIZATION: ICD-10-CM

## 2021-01-01 DIAGNOSIS — G47.00 INSOMNIA, UNSPECIFIED: ICD-10-CM

## 2021-01-01 DIAGNOSIS — R32 UNSPECIFIED URINARY INCONTINENCE: ICD-10-CM

## 2021-01-01 DIAGNOSIS — N18.30 TYPE 2 DIABETES MELLITUS WITH DIABETIC CHRONIC KIDNEY DISEASE: ICD-10-CM

## 2021-01-01 DIAGNOSIS — R54 AGE-RELATED PHYSICAL DEBILITY: ICD-10-CM

## 2021-01-01 DIAGNOSIS — E11.22 TYPE 2 DIABETES MELLITUS WITH DIABETIC CHRONIC KIDNEY DISEASE: ICD-10-CM

## 2021-01-01 DIAGNOSIS — E78.5 HYPERLIPIDEMIA, UNSPECIFIED: ICD-10-CM

## 2021-01-01 DIAGNOSIS — F02.81 PARKINSON'S DISEASE: ICD-10-CM

## 2021-01-01 DIAGNOSIS — Z91.89 OTHER SPECIFIED PERSONAL RISK FACTORS, NOT ELSEWHERE CLASSIFIED: ICD-10-CM

## 2021-01-01 DIAGNOSIS — D63.8 ANEMIA IN OTHER CHRONIC DISEASES CLASSIFIED ELSEWHERE: ICD-10-CM

## 2021-01-01 DIAGNOSIS — G20 PARKINSON'S DISEASE: ICD-10-CM

## 2021-01-01 DIAGNOSIS — F32.9 MAJOR DEPRESSIVE DISORDER, SINGLE EPISODE, UNSPECIFIED: ICD-10-CM

## 2021-01-01 DIAGNOSIS — I10 ESSENTIAL (PRIMARY) HYPERTENSION: ICD-10-CM

## 2021-01-01 DIAGNOSIS — F41.9 ANXIETY DISORDER, UNSPECIFIED: ICD-10-CM

## 2021-01-01 DIAGNOSIS — N39.0 URINARY TRACT INFECTION, SITE NOT SPECIFIED: ICD-10-CM

## 2021-01-01 DIAGNOSIS — K04.7 PERIAPICAL ABSCESS W/OUT SINUS: ICD-10-CM

## 2021-01-01 DIAGNOSIS — R05 COUGH: ICD-10-CM

## 2021-01-01 LAB
ALBUMIN SERPL ELPH-MCNC: 3.8 G/DL
ALP BLD-CCNC: 89 U/L
ALT SERPL-CCNC: 9 U/L
ANION GAP SERPL CALC-SCNC: 20 MMOL/L
AST SERPL-CCNC: 15 U/L
BASOPHILS # BLD AUTO: 0.04 K/UL
BASOPHILS NFR BLD AUTO: 0.5 %
BILIRUB SERPL-MCNC: <0.2 MG/DL
BUN SERPL-MCNC: 88 MG/DL
CALCIUM SERPL-MCNC: 9 MG/DL
CHLORIDE SERPL-SCNC: 107 MMOL/L
CO2 SERPL-SCNC: 17 MMOL/L
CREAT SERPL-MCNC: 2.6 MG/DL
EOSINOPHIL # BLD AUTO: 0.09 K/UL
EOSINOPHIL NFR BLD AUTO: 1.1 %
GLUCOSE SERPL-MCNC: 66 MG/DL
HCT VFR BLD CALC: 36 %
HGB BLD-MCNC: 11.3 G/DL
IMM GRANULOCYTES NFR BLD AUTO: 0.4 %
LYMPHOCYTES # BLD AUTO: 1.88 K/UL
LYMPHOCYTES NFR BLD AUTO: 23.1 %
MAN DIFF?: NORMAL
MCHC RBC-ENTMCNC: 26.8 PG
MCHC RBC-ENTMCNC: 31.4 GM/DL
MCV RBC AUTO: 85.5 FL
MONOCYTES # BLD AUTO: 0.67 K/UL
MONOCYTES NFR BLD AUTO: 8.2 %
NEUTROPHILS # BLD AUTO: 5.42 K/UL
NEUTROPHILS NFR BLD AUTO: 66.7 %
PLATELET # BLD AUTO: 228 K/UL
POTASSIUM SERPL-SCNC: 6 MMOL/L
PROT SERPL-MCNC: 6.1 G/DL
RBC # BLD: 4.21 M/UL
RBC # FLD: 17.8 %
SARS-COV-2 N GENE NPH QL NAA+PROBE: NOT DETECTED
SODIUM SERPL-SCNC: 144 MMOL/L
WBC # FLD AUTO: 8.13 K/UL

## 2021-01-01 PROCEDURE — 99349 HOME/RES VST EST MOD MDM 40: CPT

## 2021-01-01 RX ORDER — BUPROPION HYDROCHLORIDE 150 MG/1
150 TABLET, EXTENDED RELEASE ORAL DAILY
Qty: 90 | Refills: 3 | Status: ACTIVE | COMMUNITY
Start: 2021-01-01

## 2021-01-01 RX ORDER — ATORVASTATIN CALCIUM 40 MG/1
40 TABLET, FILM COATED ORAL
Qty: 90 | Refills: 3 | Status: DISCONTINUED | COMMUNITY
Start: 2017-08-15 | End: 2021-01-01

## 2021-01-01 RX ORDER — LISINOPRIL 5 MG/1
5 TABLET ORAL DAILY
Qty: 30 | Refills: 8 | Status: DISCONTINUED | COMMUNITY
Start: 2017-08-15 | End: 2021-01-01

## 2021-01-01 RX ORDER — CIPROFLOXACIN HYDROCHLORIDE 500 MG/1
500 TABLET, FILM COATED ORAL
Qty: 14 | Refills: 0 | Status: DISCONTINUED | COMMUNITY
Start: 2021-01-01 | End: 2021-01-01

## 2021-01-01 RX ORDER — METFORMIN HYDROCHLORIDE 500 MG/1
500 TABLET, COATED ORAL
Qty: 60 | Refills: 6 | Status: DISCONTINUED | COMMUNITY
Start: 2017-08-15 | End: 2021-01-01

## 2021-01-01 RX ORDER — BUPROPION HYDROCHLORIDE 75 MG/1
75 TABLET, FILM COATED ORAL
Qty: 30 | Refills: 6 | Status: DISCONTINUED | COMMUNITY
Start: 2017-05-17 | End: 2021-01-01

## 2021-01-27 NOTE — PHYSICAL EXAM
[Normal Sclera/Conjunctiva] : normal sclera/conjunctiva [Normal Outer Ear/Nose] : the ears and nose were normal in appearance [No JVD] : no jugular venous distention [Clear to Auscultation] : lungs were clear to auscultation bilaterally [Regular Rhythm] : with a regular rhythm [Normal Bowel Sounds] : normal bowel sounds [Non Tender] : non-tender [Normal Anterior Cervical Nodes] : no anterior cervical lymphadenopathy [No Rash] : no rash [de-identified] : chronically ill [de-identified] : no signs of infection in oral cavity, dental  implants exposed, gums pink [de-identified] : soft lipoma on anterior chest wall [de-identified] : soft [de-identified] : stooped gait [de-identified] : weak LE [de-identified] : sleepy today

## 2021-01-27 NOTE — CURRENT MEDS
[Medication and Allergies Reconciled] : medication and allergies reconciled [Compliant with medications] : Patient is compliant with medications [de-identified] : prepared and administered by others

## 2021-01-27 NOTE — COUNSELING
[Normal Weight - ( BMI  <25 )] : normal weight - ( BMI  <25 ) [Mediterranean diet recommended] : Mediterranean diet recommended [Minimize unnecessary interventions] : minimize unnecessary interventions [Discussed disease trajectory with patient/caregiver] : discussed disease trajectory with patient/caregiver [TLC diet recommended] : TLC diet recommended [Non - Smoker] : non-smoker [FreeTextEntry2] : Call [phone] for all medical concerns

## 2021-01-27 NOTE — REVIEW OF SYSTEMS
[Vision Problems] : vision problems [Constipation] : constipation [Memory Loss] : memory loss [Incontinence] : incontinence [Unsteady Walking] : ataxia [Anxiety] : anxiety [Negative] : Heme/Lymph [Suicidal] : not suicidal [FreeTextEntry2] : +fatigue [FreeTextEntry3] : +dry eyes [FreeTextEntry4] : +halitosis, drooling [FreeTextEntry7] : constipation, fecal incontinence [FreeTextEntry9] : stiff LE [de-identified] : +depression and anxiety

## 2021-03-01 PROBLEM — I10 HYPERTENSION: Status: RESOLVED | Noted: 2017-12-20 | Resolved: 2021-01-01

## 2021-03-01 PROBLEM — Z91.89 AT RISK FOR HYPOTHYROIDISM: Status: RESOLVED | Noted: 2019-12-04 | Resolved: 2021-01-01

## 2021-03-02 NOTE — CURRENT MEDS
[Medication and Allergies Reconciled] : medication and allergies reconciled [Compliant with medications] : Patient is compliant with medications [de-identified] : prepared and administered by others

## 2021-03-02 NOTE — COUNSELING
[Normal Weight - ( BMI  <25 )] : normal weight - ( BMI  <25 ) [Mediterranean diet recommended] : Mediterranean diet recommended [Minimize unnecessary interventions] : minimize unnecessary interventions [Discussed disease trajectory with patient/caregiver] : discussed disease trajectory with patient/caregiver [TLC diet recommended] : TLC diet recommended [Non - Smoker] : non-smoker [FreeTextEntry2] : Call [phone] for all medical concerns

## 2021-03-02 NOTE — PHYSICAL EXAM
[Normal Sclera/Conjunctiva] : normal sclera/conjunctiva [Normal Outer Ear/Nose] : the ears and nose were normal in appearance [No JVD] : no jugular venous distention [Clear to Auscultation] : lungs were clear to auscultation bilaterally [Regular Rhythm] : with a regular rhythm [Normal Bowel Sounds] : normal bowel sounds [Non Tender] : non-tender [Normal Anterior Cervical Nodes] : no anterior cervical lymphadenopathy [No Rash] : no rash [de-identified] : chronically ill [de-identified] : no signs of infection in oral cavity, dental  implants exposed, gums pink [de-identified] : soft lipoma on anterior chest wall [de-identified] : soft [de-identified] : stooped gait [de-identified] : weak LE [de-identified] : sleepy today

## 2021-03-02 NOTE — REVIEW OF SYSTEMS
[Vision Problems] : vision problems [Constipation] : constipation [Incontinence] : incontinence [Memory Loss] : memory loss [Unsteady Walking] : ataxia [Anxiety] : anxiety [Negative] : Heme/Lymph [Suicidal] : not suicidal [FreeTextEntry2] : +fatigue [FreeTextEntry3] : +dry eyes [FreeTextEntry4] : +halitosis, drooling [FreeTextEntry7] : constipation, fecal incontinence [FreeTextEntry9] : stiff LE [de-identified] : +depression and anxiety

## 2021-03-02 NOTE — HISTORY OF PRESENT ILLNESS
[Patient] : patient [Formal Caregiver] : formal caregiver [FreeTextEntry1] : Parkinson's Disease [FreeTextEntry2] : 89 year old female with Parkinsons Disease, DM, Hyperlipidemia,CAD, depression/anxiety, hypertension, memory loss seen for  follow up of chronic medical conditions.\par \par No issues or concerns at this time.  Appetite good- 3 meals a day.  No change in mental status. \par \par Patient denies fever, cough, trouble breathing, rash, vomiting and diarrhea. Patient has not been in close contact with someone covid positive.\par \par N95 mask, gloves, eye wear and gown used during visit: [Y]. Total face to face time with patient is 40 min.\par \par \par Patient/ patient's caregiver reports no weight loss >10lbs in the past 6 months. No changes in dentition or swallowing reported, No changes in hearing or vision reported. No changes in Cognition reported. Patient denies any symptoms of depression or anxiety. Patient is ADL and IADL dependent. No changes in gait or falls reported. \par Patient's home environment is safe.\par \par

## 2021-03-09 PROBLEM — Z23 NEED FOR COVID-19 VACCINE: Status: ACTIVE | Noted: 2021-01-01

## 2021-05-03 PROBLEM — G20 PARKINSON'S DISEASE: Status: ACTIVE | Noted: 2017-12-20

## 2021-05-04 NOTE — COUNSELING
[Normal Weight - ( BMI  <25 )] : normal weight - ( BMI  <25 ) [Mediterranean diet recommended] : Mediterranean diet recommended [Minimize unnecessary interventions] : minimize unnecessary interventions [Discussed disease trajectory with patient/caregiver] : discussed disease trajectory with patient/caregiver [TLC diet recommended] : TLC diet recommended [Non - Smoker] : non-smoker [FreeTextEntry2] : Call [phone] for all medical concerns

## 2021-05-04 NOTE — PHYSICAL EXAM
[Normal Sclera/Conjunctiva] : normal sclera/conjunctiva [Normal Outer Ear/Nose] : the ears and nose were normal in appearance [No JVD] : no jugular venous distention [Clear to Auscultation] : lungs were clear to auscultation bilaterally [Regular Rhythm] : with a regular rhythm [Normal Bowel Sounds] : normal bowel sounds [Non Tender] : non-tender [Normal Anterior Cervical Nodes] : no anterior cervical lymphadenopathy [No Rash] : no rash [de-identified] : chronically ill [de-identified] : no signs of infection in oral cavity, dental  implants exposed, gums pink [de-identified] : soft lipoma on anterior chest wall [de-identified] : soft [de-identified] : stooped gait [de-identified] : weak LE [de-identified] : sleepy today

## 2021-05-04 NOTE — CURRENT MEDS
[Medication and Allergies Reconciled] : medication and allergies reconciled [Compliant with medications] : Patient is compliant with medications [de-identified] : prepared and administered by others

## 2021-05-04 NOTE — HISTORY OF PRESENT ILLNESS
[Patient] : patient [Formal Caregiver] : formal caregiver [FreeTextEntry1] : Parkinson's Disease [FreeTextEntry2] : 89 year old female with Parkinsons Disease, DM, Hyperlipidemia,CAD, depression/anxiety, hypertension, memory loss seen for  follow up of chronic medical conditions. Remains stable no medical issues. \par \par No issues or concerns at this time.  Appetite good- 3 meals a day.  No change in mental status. \par \par Patient denies fever, cough, trouble breathing, rash, vomiting and diarrhea. Patient has not been in close contact with someone covid positive.\par \par N95 mask, gloves, eye wear and gown used during visit: [Y]. Total face to face time with patient is 40 min.\par \par \par Patient/ patient's caregiver reports no weight loss >10lbs in the past 6 months. No changes in dentition or swallowing reported, No changes in hearing or vision reported. No changes in Cognition reported. Patient denies any symptoms of depression or anxiety. Patient is ADL and IADL dependent. No changes in gait or falls reported. \par Patient's home environment is safe.\par \par

## 2021-05-04 NOTE — REVIEW OF SYSTEMS
[Vision Problems] : vision problems [Constipation] : constipation [Incontinence] : incontinence [Memory Loss] : memory loss [Unsteady Walking] : ataxia [Anxiety] : anxiety [Negative] : Heme/Lymph [Suicidal] : not suicidal [FreeTextEntry2] : +fatigue [FreeTextEntry3] : +dry eyes [FreeTextEntry4] : +halitosis, drooling [FreeTextEntry7] : constipation, fecal incontinence [FreeTextEntry9] : stiff LE [de-identified] : +depression and anxiety

## 2021-05-04 NOTE — HEALTH RISK ASSESSMENT
[Full assistance needed] : managing finances [Yes] : The patient does have visual impairment [No falls in past year] : Patient reported no falls in the past year [HRA Reviewed] : Health risk assessments reviewed [TimeGetUpGo] : 0

## 2021-07-01 PROBLEM — E11.22 TYPE 2 DIABETES MELLITUS WITH STAGE 3 CHRONIC KIDNEY DISEASE, WITHOUT LONG-TERM CURRENT USE OF INSULIN: Status: ACTIVE | Noted: 2019-04-12

## 2021-07-01 PROBLEM — F32.9 DEPRESSION: Status: ACTIVE | Noted: 2017-12-20

## 2021-07-01 PROBLEM — F41.9 ANXIETY: Status: ACTIVE | Noted: 2017-12-20

## 2021-07-01 PROBLEM — E78.5 HYPERLIPIDEMIA: Status: ACTIVE | Noted: 2017-12-20

## 2021-07-01 PROBLEM — K04.7 DENTAL INFECTION: Status: ACTIVE | Noted: 2019-02-21

## 2021-07-01 PROBLEM — R54 FRAIL ELDERLY: Status: ACTIVE | Noted: 2021-01-01

## 2021-07-01 PROBLEM — R32 URINARY INCONTINENCE IN FEMALE: Status: ACTIVE | Noted: 2018-08-10

## 2021-07-01 PROBLEM — G47.00 INSOMNIA: Status: ACTIVE | Noted: 2017-12-20

## 2021-07-01 PROBLEM — G20 DEMENTIA DUE TO PARKINSON'S DISEASE WITH BEHAVIORAL DISTURBANCE: Status: ACTIVE | Noted: 2018-12-20

## 2021-07-01 NOTE — PHYSICAL EXAM
[de-identified] : chronically ill [de-identified] : no signs of infection in oral cavity, dental  implants exposed, gums pink [de-identified] : soft lipoma on anterior chest wall [de-identified] : soft [de-identified] : stooped gait [de-identified] : weak LE [de-identified] : sleepy today

## 2021-07-01 NOTE — REVIEW OF SYSTEMS
[Suicidal] : not suicidal [FreeTextEntry2] : +fatigue [FreeTextEntry3] : +dry eyes [FreeTextEntry4] : +halitosis, drooling [FreeTextEntry7] : constipation, fecal incontinence [FreeTextEntry9] : stiff LE [de-identified] : +depression and anxiety

## 2021-07-01 NOTE — HISTORY OF PRESENT ILLNESS
[FreeTextEntry1] : Parkinson's Disease [FreeTextEntry2] : 89 year old female with Parkinsons Disease, DM, Hyperlipidemia,CAD, depression/anxiety, hypertension, memory loss seen for  follow up of chronic medical conditions. Remains stable no medical issues. \par \par No issues or concerns at this time.  Appetite good- 3 meals a day.  No change in mental status. \par \par Patient denies fever, cough, trouble breathing, rash, vomiting and diarrhea. Patient has not been in close contact with someone covid positive.\par \par N95 mask, gloves, eye wear and gown used during visit: [Y]. Total face to face time with patient is 40 min.\par \par \par Patient/ patient's caregiver reports no weight loss >10lbs in the past 6 months. No changes in dentition or swallowing reported, No changes in hearing or vision reported. No changes in Cognition reported. Patient denies any symptoms of depression or anxiety. Patient is ADL and IADL dependent. No changes in gait or falls reported. \par Patient's home environment is safe.\par \par

## 2021-07-29 PROBLEM — D63.8 ANEMIA IN CHRONIC ILLNESS: Status: ACTIVE | Noted: 2021-01-01

## 2021-07-29 PROBLEM — R05 COUGH: Status: ACTIVE | Noted: 2021-01-01

## 2021-07-29 PROBLEM — N39.0 ACUTE UTI: Status: ACTIVE | Noted: 2021-01-01
